# Patient Record
Sex: MALE | Race: BLACK OR AFRICAN AMERICAN | NOT HISPANIC OR LATINO | URBAN - NONMETROPOLITAN AREA
[De-identification: names, ages, dates, MRNs, and addresses within clinical notes are randomized per-mention and may not be internally consistent; named-entity substitution may affect disease eponyms.]

---

## 2018-06-14 PROBLEM — H52.4: Noted: 2018-06-14

## 2018-06-14 PROBLEM — H25.13: Noted: 2018-06-14

## 2018-06-14 PROBLEM — E11.3393: Noted: 2018-06-14

## 2020-06-26 ENCOUNTER — IMPORTED ENCOUNTER (OUTPATIENT)
Dept: URBAN - NONMETROPOLITAN AREA CLINIC 1 | Facility: CLINIC | Age: 50
End: 2020-06-26

## 2020-06-26 PROCEDURE — 92014 COMPRE OPH EXAM EST PT 1/>: CPT

## 2020-06-26 PROCEDURE — 92015 DETERMINE REFRACTIVE STATE: CPT

## 2020-06-26 NOTE — PATIENT DISCUSSION
Presbyopia / Hyperopia OUDiscussed refractive status in detail with patient. New glasses Rx given today. Continue to monitor. NIDDM with NPDRDiscussed diagnosis with patient. Discussed the risk of diabetic damage of the retina with potential vision loss and the importance of routine follow-up. Emphasized strict blood sugar control. Continue to monitor. RTC 6 months with OCT Adia OUDiscussed diagnosis in detail with patient. Discussed signs and symptoms of progression. Discussed UV protection. No treatment needed at this time. Continue to monitor.

## 2021-08-09 ENCOUNTER — IMPORTED ENCOUNTER (OUTPATIENT)
Dept: URBAN - NONMETROPOLITAN AREA CLINIC 1 | Facility: CLINIC | Age: 51
End: 2021-08-09

## 2021-08-09 PROBLEM — E11.3313: Noted: 2021-08-09

## 2021-08-09 PROBLEM — H52.4: Noted: 2018-06-14

## 2021-08-09 PROBLEM — H25.13: Noted: 2018-06-14

## 2021-08-09 PROCEDURE — 92014 COMPRE OPH EXAM EST PT 1/>: CPT

## 2021-08-09 PROCEDURE — 92015 DETERMINE REFRACTIVE STATE: CPT

## 2021-08-09 NOTE — PATIENT DISCUSSION
Presbyopia / Hyperopia OUDiscussed refractive status in detail with patient. New glasses Rx given today. Continue to monitor. 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 Discussed the risk of diabetic damage of the retina with potential vision loss and the importance of routine follow-up. Emphasized strict blood sugar control. Patient has had one injection in his left eye. Optos done today; shows dot/blots OURecommend refer to Dr. Tatianna Wiess for further evaluation and treatment. Adia OUDiscussed diagnosis in detail with patient. Discussed signs and symptoms of progression. Discussed UV protection. No treatment needed at this time. Continue to monitor.

## 2022-04-10 ASSESSMENT — TONOMETRY
OS_IOP_MMHG: 11
OS_IOP_MMHG: 16
OD_IOP_MMHG: 15
OD_IOP_MMHG: 10

## 2022-04-10 ASSESSMENT — VISUAL ACUITY
OS_PH: 20/40
OS_SC: 20/20-
OD_SC: 20/20
OD_SC: 20/20
OS_SC: 20/50